# Patient Record
Sex: MALE | Race: OTHER | Employment: UNEMPLOYED | URBAN - METROPOLITAN AREA
[De-identification: names, ages, dates, MRNs, and addresses within clinical notes are randomized per-mention and may not be internally consistent; named-entity substitution may affect disease eponyms.]

---

## 2017-10-02 DIAGNOSIS — B00.1 HERPES LABIALIS: ICD-10-CM

## 2017-10-02 RX ORDER — VALACYCLOVIR HYDROCHLORIDE 1 G/1
1000 TABLET, FILM COATED ORAL 2 TIMES DAILY
Qty: 14 TABLET | Refills: 3 | Status: SHIPPED | OUTPATIENT
Start: 2017-10-02 | End: 2017-10-03 | Stop reason: SDUPTHER

## 2017-10-03 DIAGNOSIS — B00.1 HERPES LABIALIS: ICD-10-CM

## 2017-10-03 RX ORDER — VALACYCLOVIR HYDROCHLORIDE 1 G/1
1000 TABLET, FILM COATED ORAL 2 TIMES DAILY
Qty: 14 TABLET | Refills: 3 | Status: SHIPPED | OUTPATIENT
Start: 2017-10-03 | End: 2019-01-15 | Stop reason: SDUPTHER

## 2017-10-25 ENCOUNTER — TELEPHONE (OUTPATIENT)
Dept: FAMILY MEDICINE CLINIC | Age: 22
End: 2017-10-25

## 2018-08-20 ENCOUNTER — TELEPHONE (OUTPATIENT)
Dept: FAMILY MEDICINE CLINIC | Age: 23
End: 2018-08-20

## 2019-01-15 DIAGNOSIS — B00.1 HERPES LABIALIS: ICD-10-CM

## 2019-01-15 RX ORDER — VALACYCLOVIR HYDROCHLORIDE 1 G/1
1000 TABLET, FILM COATED ORAL 2 TIMES DAILY
Qty: 14 TABLET | Refills: 3 | Status: SHIPPED | OUTPATIENT
Start: 2019-01-15 | End: 2019-05-30 | Stop reason: SDUPTHER

## 2019-05-30 ENCOUNTER — OFFICE VISIT (OUTPATIENT)
Dept: FAMILY MEDICINE CLINIC | Age: 24
End: 2019-05-30

## 2019-05-30 VITALS
WEIGHT: 159 LBS | HEART RATE: 63 BPM | TEMPERATURE: 98.3 F | BODY MASS INDEX: 22.81 KG/M2 | SYSTOLIC BLOOD PRESSURE: 115 MMHG | OXYGEN SATURATION: 99 % | DIASTOLIC BLOOD PRESSURE: 76 MMHG

## 2019-05-30 DIAGNOSIS — H93.12 LEFT-SIDED TINNITUS: Primary | ICD-10-CM

## 2019-05-30 DIAGNOSIS — B00.1 HERPES LABIALIS: ICD-10-CM

## 2019-05-30 PROCEDURE — 99213 OFFICE O/P EST LOW 20 MIN: CPT | Performed by: FAMILY MEDICINE

## 2019-05-30 RX ORDER — VALACYCLOVIR HYDROCHLORIDE 1 G/1
1000 TABLET, FILM COATED ORAL 2 TIMES DAILY
Qty: 20 TABLET | Refills: 3 | Status: SHIPPED | OUTPATIENT
Start: 2019-05-30 | End: 2019-12-18

## 2019-05-30 ASSESSMENT — PATIENT HEALTH QUESTIONNAIRE - PHQ9
SUM OF ALL RESPONSES TO PHQ QUESTIONS 1-9: 0
2. FEELING DOWN, DEPRESSED OR HOPELESS: 0
SUM OF ALL RESPONSES TO PHQ9 QUESTIONS 1 & 2: 0
1. LITTLE INTEREST OR PLEASURE IN DOING THINGS: 0
SUM OF ALL RESPONSES TO PHQ QUESTIONS 1-9: 0

## 2019-05-30 NOTE — LETTER
[facility]  [address] [facility] [address]  Phone: [phone]  Fax: [phone]    Ariel Rubio MD        May 30, 2019     Patient: Maria A Teran   YOB: 1995   Date of Visit: 5/30/2019       To Whom it May Concern:    Maria A Teran was seen in my clinic on 5/30/2019. He may return to work on on 05/31/2019. If you have any questions or concerns, please don't hesitate to call.     Sincerely,         Ariel Rubio MD

## 2019-05-30 NOTE — PATIENT INSTRUCTIONS
Patient Education        Tinnitus: Care Instructions  Your Care Instructions    Many people have some ringing sounds in their ears once in a while. You may hear a roar, a hiss, a tinkle, or a buzz. The sound usually lasts only a few minutes. If it goes on all the time, you may have tinnitus. Tinnitus is usually caused by long-term exposure to loud noise. This damages the nerves in the inner ear. It can occur with all types of hearing loss. It may be a symptom of almost any ear problem. Tinnitus may be caused by a buildup of earwax. Or it may be caused by ear infections or certain medicines (especially antibiotics or large amounts of aspirin). You can also hear noises in your ears because of an injury to the ears, drinking too much alcohol or caffeine, or a medical condition. You may need tests to evaluate your hearing and to find causes of long-lasting tinnitus. Your doctor may suggest one or more treatments to help you cope with it. You can also do things at home to help reduce symptoms. Follow-up care is a key part of your treatment and safety. Be sure to make and go to all appointments, and call your doctor if you are having problems. It's also a good idea to know your test results and keep a list of the medicines you take. How can you care for yourself at home? · Limit or cut out alcohol and caffeine. They can make your symptoms worse. · Do not smoke or use other tobacco products. Nicotine reduces blood flow to the ear and makes tinnitus worse. If you need help quitting, talk to your doctor about stop-smoking programs and medicines. These can increase your chances of quitting for good. · Talk to your doctor about whether to stop taking aspirin and similar products such as ibuprofen or naproxen. · Get exercise often. It can improve blood flow to the ear. Ways to cope with noise  Some tinnitus may last a long time. To cope with noise, try to:  · Avoid noises that you think caused your tinnitus.  If you can't avoid loud noises, wear earplugs or earmuffs. · Ignore the sound by paying attention to other things. · Relax using biofeedback, meditation, or yoga. Feeling stressed and being tired can make tinnitus worse. · Play music or white noise to help you sleep. Background noise may cover up the noise that you hear in your ears. You can buy a machine that makes soothing sounds, such as ocean waves. When should you call for help? Call 911 anytime you think you may need emergency care. For example, call if:    · You have symptoms of a stroke. These may include:  ? Sudden numbness, tingling, weakness, or loss of movement in your face, arm, or leg, especially on only one side of your body. ? Sudden vision changes. ? Sudden trouble speaking. ? Sudden confusion or trouble understanding simple statements. ? Sudden problems with walking or balance. ? A sudden, severe headache that is different from past headaches.    Call your doctor now or seek immediate medical care if:    · You develop other symptoms. These may include hearing loss (or worse hearing loss), balance problems, dizziness, nausea, or vomiting.    Watch closely for changes in your health, and be sure to contact your doctor if:    · Your tinnitus moves from both ears to one ear.     · Your hearing loss gets worse within 1 day after an ear injury.     · Your tinnitus or hearing loss does not get better within 1 week after an ear injury.     · Your tinnitus bothers you enough that you want to take medicines to help you cope with it. Where can you learn more? Go to [email or web address]://[email or web address]. org and sign in to your [employer] account. Enter S165 in the [facility] box to learn more about \"Tinnitus: Care Instructions. \"     If you do not have an account, please click on the \"Sign Up Now\" link. Current as of: October 21, 2018  Content Version: 12.0  © 3547-6572 Healthwise, Incorporated.  Care instructions adapted under license by [facility]. If you have questions about a medical condition or this instruction, always ask your healthcare professional. [place] [address] any warranty or liability for your use of this information.

## 2019-05-30 NOTE — PROGRESS NOTES
General FM note    Henri Lesches is a 21 y.o. male who presents today for follow up on his  medical conditions as noted below. Henri Lesches is c/o of   Chief Complaint   Patient presents with    Medication Check       Patient Active Problem List:     Herpes labialis     Past Medical History:   Diagnosis Date    Back pain 2010    sport injury fx L5      Past Surgical History:   Procedure Laterality Date    CYST REMOVAL Left 11/13/2015    testicle     No family history on file. Current Outpatient Medications   Medication Sig Dispense Refill    valACYclovir (VALTREX) 1 g tablet Take 1 tablet by mouth 2 times daily 20 tablet 3    ibuprofen (ADVIL;MOTRIN) 800 MG tablet Take 800 mg by mouth every 6 hours as needed for Pain      oxyCODONE-acetaminophen (PERCOCET) 5-325 MG per tablet Take 1 tablet by mouth every 4 hours as needed for Pain       No current facility-administered medications for this visit. ALLERGIES:  No Known Allergies    Social History     Tobacco Use    Smoking status: Never Smoker   Substance Use Topics    Alcohol use: Yes     Comment: social      Body mass index is 22.81 kg/m². /76   Pulse 63   Temp 98.3 °F (36.8 °C) (Oral)   Wt 159 lb (72.1 kg)   SpO2 99%   BMI 22.81 kg/m²     Subjective:      HPI    22 yo male here for check up. Complains:   1. Static sound at the L ear for some months. Not there all the time. 2. Cold sore: first one in a while. The patient was on Valtrex and he wants to have a refill. Got a new job at the [employer].  at [address] [place] [address]. Review of Systems   Constitutional: Negative for fever and unexpected weight change. HENT: Negative for ear pain, congestion, sore throat and rhinorrhea. Positive for sound in the left ear. Respiratory: Negative for cough and shortness of breath. Cardiovascular: Negative for chest pain and leg swelling. Gastrointestinal: Negative for diarrhea, constipation and blood in stool.    Musculoskeletal: Negative for back pain and gait problem. Skin: Negative for color change and rash. Positive for cold sore. Objective:   Physical Exam  Constitutional: VS (see above). General appearance: normal development, habitus and attention, no deformities. No distress. Eyes: normal conjunctiva and lids. Ears: Bilaterally with normal limits. No cerumen present. CAV: RRR, no RMG. No edema lower extremities. Pulmo: CTA bilateral, no CWR. Skin: no rashes, lesions or ulcers. Lower lip, left L there is a swelling present. I did not observe any blisters. Musculoskeletal: normal gait. Nails: no clubbing or cyanosis. Psychiatric: alert and oriented to place, time and person. Normal mood and affect. Assessment:       Diagnosis Orders   1. Left-sided tinnitus     2. Herpes labialis  valACYclovir (VALTREX) 1 g tablet       Plan:   I discussed with patient that the left-sided sound in this year. It's probably a tinnitus. This needs to be watched. He needs to avoid loud noises. He needs to avoid wearing headphones. Valtrex provided. Call or return to clinic prn if these symptoms worsen or fail to improve as anticipated. I have reviewed the instructions with the patient, answering all questions to his satisfaction. Return in about 1 year (around 5/30/2020), or if symptoms worsen or fail to improve. No orders of the defined types were placed in this encounter. Orders Placed This Encounter   Medications    valACYclovir (VALTREX) 1 g tablet     Sig: Take 1 tablet by mouth 2 times daily     Dispense:  20 tablet     Refill:  3       Deacon received counseling on the following healthy behaviors: nutrition and exercise  Reviewed prior labs and health maintenance  Continue current medications, diet and exercise. Discussed use, benefit, and side effects of prescribed medications. Barriers to medication compliance addressed.    Patient given educational materials - see patient instructions  Was a self-tracking handout given in paper form or via [employer]? No: .    Requested Prescriptions     Signed Prescriptions Disp Refills    valACYclovir (VALTREX) 1 g tablet 20 tablet 3     Sig: Take 1 tablet by mouth 2 times daily       All patient questions answered. Patient voiced understanding. Quality Measures    Body mass index is 22.81 kg/m². Normal. Weight control planned discussed Healthy diet and regular exercise. BP: 115/76 Blood pressure is normal. Treatment plan consists of No treatment change needed.     No results found for: LDLCALC, LDLCHOLESTEROL, LDLDIRECT (goal LDL reduction with dx if diabetes is 50% LDL reduction)      PHQ Scores 5/30/2019   PHQ2 Score 0   PHQ9 Score 0     Interpretation of Total Score Depression Severity: 1-4 = Minimal depression, 5-9 = Mild depression, 10-14 = Moderate depression, 15-19 = Moderately severe depression, 20-27 = Severe depression     Electronically signed by Venkatesh Campos MD on 5/31/2019 at 5:57 AM       (Please note that portions of this note were completed with a voice recognition program. Efforts were made to edit the dictations but occasionally words are mis-transcribed.)

## 2019-06-25 ENCOUNTER — OFFICE VISIT (OUTPATIENT)
Dept: FAMILY MEDICINE CLINIC | Age: 24
End: 2019-06-25
Payer: COMMERCIAL

## 2019-06-25 VITALS
HEIGHT: 71 IN | TEMPERATURE: 97.9 F | SYSTOLIC BLOOD PRESSURE: 116 MMHG | OXYGEN SATURATION: 97 % | BODY MASS INDEX: 22.68 KG/M2 | HEART RATE: 78 BPM | DIASTOLIC BLOOD PRESSURE: 64 MMHG | WEIGHT: 162 LBS

## 2019-06-25 DIAGNOSIS — Z00.00 ENCOUNTER FOR WELL ADULT EXAM WITHOUT ABNORMAL FINDINGS: Primary | ICD-10-CM

## 2019-06-25 PROCEDURE — 99395 PREV VISIT EST AGE 18-39: CPT | Performed by: FAMILY MEDICINE

## 2019-06-25 NOTE — PROGRESS NOTES
Subjective:       Cecilio Moss is a 21 y.o. male and is here for a comprehensive physical exam.  The patient reports problems - :    Feels when sitting down knees higher than leg that there is not good circulations. Feels that when reading in bed numbness at the 4th and 5th fingers. Feels that when it is cold outside his hands and feet are very cold and he does have a lot of warmness to heat/warm them up again.  History:  Any STD's in the past? none  Past Medical History:   Diagnosis Date    Back pain 2010    sport injury fx L5     Patient Active Problem List    Diagnosis Date Noted    Herpes labialis 02/27/2015     Past Surgical History:   Procedure Laterality Date    CYST REMOVAL Left 11/13/2015    testicle     No family history on file.   Social History     Socioeconomic History    Marital status: Single     Spouse name: None    Number of children: None    Years of education: None    Highest education level: None   Occupational History    None   Social Needs    Financial resource strain: None    Food insecurity:     Worry: None     Inability: None    Transportation needs:     Medical: None     Non-medical: None   Tobacco Use    Smoking status: Never Smoker    Smokeless tobacco: Never Used   Substance and Sexual Activity    Alcohol use: Yes     Comment: social    Drug use: No    Sexual activity: None   Lifestyle    Physical activity:     Days per week: None     Minutes per session: None    Stress: None   Relationships    Social connections:     Talks on phone: None     Gets together: None     Attends Alevism service: None     Active member of club or organization: None     Attends meetings of clubs or organizations: None     Relationship status: None    Intimate partner violence:     Fear of current or ex partner: None     Emotionally abused: None     Physically abused: None     Forced sexual activity: None   Other Topics Concern    None   Social History Narrative    None     Current 78   Temp 97.9 °F (36.6 °C) (Oral)   Ht 5' 11\" (1.803 m)   Wt 162 lb (73.5 kg)   SpO2 97%   BMI 22.59 kg/m²   Constitutional: Alert and oriented. Well-nourished. No distress. Head: Normocephalic and atraumatic. Right Ear: External ear normal. TM: no bulging, erythema or fluid seen. Left Ear: External ear normal. TM: no bulging, erythema or fluid seen. Nose: Nose normal.   Mouth/Throat: Oropharynx is clear and moist. teeth in good repair. Eyes: Pupils are equal, round, and reactive to light. Right eye exhibits no discharge. Left eye exhibits no discharge. No scleral icterus. Neck: Normal range of motion. Neck supple. No JVD present. No tracheal deviation present. No thyromegaly present. Cardiovascular: Normal rate, regular rhythm, normal heart sounds. Pulmonary/Chest: Effort normal and breath sounds normal. No respiratory distress. He has no wheezes. He has no rales. Abdominal: Soft. Bowel sounds are normal.  He exhibits no distension and no mass. There is no tenderness. There is no rebound and no guarding. Musculoskeletal: Normal range of motion. He exhibits no edema or tenderness. Lymphadenopathy:    He has no cervical adenopathy. Neurological:  He is alert and oriented to person, place, and time. Cranial nerves grossly intact. No sensation problem noted. Muscle strength 5/5 throughout. Skin: Skin is warm and dry. No rash noted. No erythema. Psychiatric:  He has a normal mood and affect. Behavior is normal.    Aure Samano was seen today for annual exam and anemia. Diagnoses and all orders for this visit:    Encounter for well adult exam without abnormal findings  -     CBC Auto Differential; Future  -     Comprehensive Metabolic Panel; Future  -     Urinalysis; Future  -     TSH with Reflex; Future    Call or return to clinic prn if these symptoms worsen or fail to improve as anticipated.   I have reviewed the instructions with the patient, answering all questions to his satisfaction. Patient Counseling:  --Nutrition: Stressed importance of moderation in sodium/caffeine intake, saturated fat and cholesterol, caloric balance, sufficient intake of fresh fruits, vegetables, fiber, calcium, iron, and 1 mg of folate supplement per day (for females capable of pregnancy). --Exercise: Stressed the importance of regular exercise. --Substance Abuse: Discussed cessation/primary prevention of tobacco, alcohol, or other drug use; driving or other dangerous activities under the influence; availability of treatment for abuse. --Sexuality: Discussed sexually transmitted diseases, partner selection, use of condoms, avoidance of unintended pregnancy  and contraceptive alternatives. --Injury prevention: Discussed safety belts, safety helmets, smoke detector, smoking near bedding or upholstery. --Dental health: Discussed importance of regular tooth brushing, flossing, and dental visits. --Immunizations reviewed.   --After hours service discussed with patient    Follow up in one year     (Please note that portions of this note were completed with a voice recognition program. Efforts were made to edit the dictations but occasionally words are mis-transcribed.)

## 2019-09-09 ENCOUNTER — OFFICE VISIT (OUTPATIENT)
Dept: FAMILY MEDICINE CLINIC | Age: 24
End: 2019-09-09
Payer: COMMERCIAL

## 2019-09-09 VITALS
OXYGEN SATURATION: 96 % | TEMPERATURE: 98.1 F | BODY MASS INDEX: 22.32 KG/M2 | HEART RATE: 68 BPM | DIASTOLIC BLOOD PRESSURE: 74 MMHG | WEIGHT: 160 LBS | SYSTOLIC BLOOD PRESSURE: 117 MMHG

## 2019-09-09 DIAGNOSIS — J06.9 VIRAL URI: Primary | ICD-10-CM

## 2019-09-09 PROCEDURE — 99213 OFFICE O/P EST LOW 20 MIN: CPT | Performed by: FAMILY MEDICINE

## 2019-09-09 RX ORDER — FLUTICASONE PROPIONATE 50 MCG
1 SPRAY, SUSPENSION (ML) NASAL DAILY
Qty: 2 BOTTLE | Refills: 1 | Status: SHIPPED | OUTPATIENT
Start: 2019-09-09 | End: 2019-12-18

## 2019-09-09 RX ORDER — AZITHROMYCIN 250 MG/1
TABLET, FILM COATED ORAL
Qty: 6 TABLET | Refills: 0 | Status: SHIPPED | OUTPATIENT
Start: 2019-09-09 | End: 2019-09-19

## 2019-12-09 ENCOUNTER — OFFICE VISIT (OUTPATIENT)
Dept: FAMILY MEDICINE CLINIC | Age: 24
End: 2019-12-09
Payer: COMMERCIAL

## 2019-12-09 VITALS
SYSTOLIC BLOOD PRESSURE: 114 MMHG | DIASTOLIC BLOOD PRESSURE: 67 MMHG | WEIGHT: 160 LBS | TEMPERATURE: 98.4 F | OXYGEN SATURATION: 98 % | HEART RATE: 75 BPM | BODY MASS INDEX: 22.32 KG/M2

## 2019-12-09 DIAGNOSIS — K52.9 ACUTE GASTROENTERITIS: Primary | ICD-10-CM

## 2019-12-09 PROCEDURE — 99213 OFFICE O/P EST LOW 20 MIN: CPT | Performed by: FAMILY MEDICINE

## 2019-12-09 RX ORDER — ONDANSETRON 4 MG/1
4 TABLET, ORALLY DISINTEGRATING ORAL 3 TIMES DAILY PRN
Qty: 21 TABLET | Refills: 0 | Status: SHIPPED | OUTPATIENT
Start: 2019-12-09 | End: 2019-12-18

## 2019-12-18 ENCOUNTER — OFFICE VISIT (OUTPATIENT)
Dept: FAMILY MEDICINE CLINIC | Age: 24
End: 2019-12-18
Payer: COMMERCIAL

## 2019-12-18 VITALS
WEIGHT: 163.8 LBS | DIASTOLIC BLOOD PRESSURE: 73 MMHG | HEART RATE: 54 BPM | BODY MASS INDEX: 22.85 KG/M2 | SYSTOLIC BLOOD PRESSURE: 117 MMHG | TEMPERATURE: 97.1 F | OXYGEN SATURATION: 97 %

## 2019-12-18 DIAGNOSIS — J02.9 ACUTE VIRAL PHARYNGITIS: Primary | ICD-10-CM

## 2019-12-18 DIAGNOSIS — H61.21 CERUMEN DEBRIS ON TYMPANIC MEMBRANE OF RIGHT EAR: ICD-10-CM

## 2019-12-18 DIAGNOSIS — J02.9 SORE THROAT: ICD-10-CM

## 2019-12-18 LAB — S PYO AG THROAT QL: NORMAL

## 2019-12-18 PROCEDURE — 87880 STREP A ASSAY W/OPTIC: CPT | Performed by: FAMILY MEDICINE

## 2019-12-18 PROCEDURE — 99213 OFFICE O/P EST LOW 20 MIN: CPT | Performed by: FAMILY MEDICINE

## 2020-01-07 ENCOUNTER — OFFICE VISIT (OUTPATIENT)
Dept: FAMILY MEDICINE CLINIC | Age: 25
End: 2020-01-07
Payer: COMMERCIAL

## 2020-01-07 VITALS
DIASTOLIC BLOOD PRESSURE: 66 MMHG | OXYGEN SATURATION: 99 % | WEIGHT: 158 LBS | HEART RATE: 87 BPM | SYSTOLIC BLOOD PRESSURE: 108 MMHG | BODY MASS INDEX: 22.12 KG/M2 | HEIGHT: 71 IN

## 2020-01-07 PROCEDURE — 99213 OFFICE O/P EST LOW 20 MIN: CPT | Performed by: FAMILY MEDICINE

## 2020-01-07 RX ORDER — ALBUTEROL SULFATE 90 UG/1
2 AEROSOL, METERED RESPIRATORY (INHALATION) EVERY 4 HOURS PRN
Qty: 1 INHALER | Refills: 3 | Status: SHIPPED | OUTPATIENT
Start: 2020-01-07

## 2020-01-07 RX ORDER — PREDNISONE 10 MG/1
TABLET ORAL
Qty: 15 TABLET | Refills: 0 | Status: SHIPPED | OUTPATIENT
Start: 2020-01-07

## 2020-01-07 RX ORDER — AZITHROMYCIN 500 MG/1
500 TABLET, FILM COATED ORAL DAILY
Qty: 5 TABLET | Refills: 0 | Status: SHIPPED | OUTPATIENT
Start: 2020-01-07 | End: 2020-01-12

## 2020-01-07 ASSESSMENT — ENCOUNTER SYMPTOMS
NAUSEA: 0
COLOR CHANGE: 0
CONSTIPATION: 0
ANAL BLEEDING: 0
VOMITING: 0
PHOTOPHOBIA: 0
BACK PAIN: 0
RHINORRHEA: 1
FACIAL SWELLING: 0
APNEA: 0
EYE PAIN: 0
SHORTNESS OF BREATH: 1
DIARRHEA: 0
ABDOMINAL DISTENTION: 0
CHEST TIGHTNESS: 0
COUGH: 1
WHEEZING: 1
EYE DISCHARGE: 0
TROUBLE SWALLOWING: 0
ABDOMINAL PAIN: 0
SINUS PRESSURE: 0
SORE THROAT: 1

## 2020-01-07 ASSESSMENT — PATIENT HEALTH QUESTIONNAIRE - PHQ9
2. FEELING DOWN, DEPRESSED OR HOPELESS: 0
1. LITTLE INTEREST OR PLEASURE IN DOING THINGS: 0
SUM OF ALL RESPONSES TO PHQ QUESTIONS 1-9: 0
SUM OF ALL RESPONSES TO PHQ QUESTIONS 1-9: 0
SUM OF ALL RESPONSES TO PHQ9 QUESTIONS 1 & 2: 0

## 2020-01-07 NOTE — PROGRESS NOTES
Sharath Lal MD, PhD [facility]  21.      Maegan Burleson is a 21 y.o. male who presents today for his medical conditions/complaints as noted below. Maegan Burleson is c/o of   Chief Complaint   Patient presents with    Shortness of Breath     wheezing    Congestion     chest and nasal    Cough     productive         HPI:     Shortness of Breath   This is a recurrent problem. The current episode started in the past 7 days. The problem has been rapidly worsening. Associated symptoms include rhinorrhea, a sore throat and wheezing. Pertinent negatives include no abdominal pain, chest pain, fever, neck pain, rash or vomiting. Cough   This is a recurrent problem. The current episode started in the past 7 days. The problem has been gradually worsening. The cough is productive of sputum. Associated symptoms include postnasal drip, rhinorrhea, a sore throat, shortness of breath and wheezing. Pertinent negatives include no chest pain, fever or rash. No results found for: LABA1C          ( goal A1C is < 7)   No results found for: LABMICR  No results found for: LDLCHOLESTEROL, LDLCALC    (goal LDL is <100)   BUN (mg/dL)   Date Value   11/27/2015 25 (H)     BP Readings from Last 3 Encounters:   01/07/20 108/66   12/18/19 117/73   12/09/19 114/67          (goal 120/80)    Past Medical History:   Diagnosis Date    Back pain 2010    sport injury fx L5      Past Surgical History:   Procedure Laterality Date    CYST REMOVAL Left 11/13/2015    testicle       No family history on file.     Social History     Tobacco Use    Smoking status: Never Smoker    Smokeless tobacco: Never Used   Substance Use Topics    Alcohol use: Yes     Comment: social      Current Outpatient Medications   Medication Sig Dispense Refill    azithromycin (ZITHROMAX) 500 MG tablet Take 1 tablet by mouth daily for 5 days 5 tablet 0    predniSONE (DELTASONE) 10 General: He is not in acute distress. Appearance: He is well-developed. HENT:      Head: Normocephalic. Mouth/Throat:     Neck:      Musculoskeletal: Normal range of motion and neck supple. Thyroid: No thyromegaly. Cardiovascular:      Rate and Rhythm: Normal rate and regular rhythm. Heart sounds: Normal heart sounds. No murmur. Pulmonary:      Breath sounds: Wheezing and rhonchi present. No rales. Chest:      Chest wall: No tenderness. Abdominal:      General: Bowel sounds are normal. There is no distension. Palpations: Abdomen is soft. There is no mass. Tenderness: There is no tenderness. There is no guarding or rebound. Musculoskeletal: Normal range of motion. Lymphadenopathy:      Cervical: No cervical adenopathy. Skin:     General: Skin is warm. Findings: No rash. Neurological:      Mental Status: He is alert and oriented to person, place, and time. Psychiatric:         Attention and Perception: Attention and perception normal.         Mood and Affect: Mood is depressed. Speech: Speech normal.         Behavior: Behavior normal. Behavior is cooperative. /66   Pulse 87   Ht 5' 10.98\" (1.803 m)   Wt 158 lb (71.7 kg)   SpO2 99%   BMI 22.05 kg/m²     Assessment:       Diagnosis Orders   1. Acute asthmatic bronchitis     2. Acute bacterial pharyngitis                   Plan:    Zithromax daily x 5 days  Prednisone taperred  Ventolin HFA prn  Counseling  The current medical regimen is effective;  continue present plan and medications. Return in about 1 month (around 2/7/2020) for follow up. No orders of the defined types were placed in this encounter. Patient given educational materials - see patient instructions. Discussed use, benefit,and side effects of prescribed medications. All patient questions answered. Pt voiced understanding. Reviewed health maintenance. Instructed to continue current medications, diet and exercise. Patient agreed withtreatment plan. Follow up as directed.      Electronically signed by Bhanu Navarrete MD on 1/7/2020 at 6:43 PM

## 2020-03-10 ENCOUNTER — OFFICE VISIT (OUTPATIENT)
Dept: FAMILY MEDICINE CLINIC | Age: 25
End: 2020-03-10
Payer: COMMERCIAL

## 2020-03-10 VITALS
DIASTOLIC BLOOD PRESSURE: 70 MMHG | WEIGHT: 169.4 LBS | BODY MASS INDEX: 24.25 KG/M2 | HEART RATE: 81 BPM | RESPIRATION RATE: 16 BRPM | OXYGEN SATURATION: 98 % | HEIGHT: 70 IN | SYSTOLIC BLOOD PRESSURE: 121 MMHG

## 2020-03-10 PROCEDURE — 99213 OFFICE O/P EST LOW 20 MIN: CPT | Performed by: FAMILY MEDICINE

## 2020-03-10 RX ORDER — CIPROFLOXACIN 500 MG/1
500 TABLET, FILM COATED ORAL 2 TIMES DAILY
Qty: 10 TABLET | Refills: 0 | Status: SHIPPED | OUTPATIENT
Start: 2020-03-10 | End: 2020-03-15

## 2020-03-10 RX ORDER — PROMETHAZINE HYDROCHLORIDE 25 MG/1
25 TABLET ORAL EVERY 6 HOURS PRN
Qty: 30 TABLET | Refills: 1 | Status: SHIPPED | OUTPATIENT
Start: 2020-03-10 | End: 2020-03-17

## 2020-03-10 ASSESSMENT — ENCOUNTER SYMPTOMS
ABDOMINAL DISTENTION: 0
EYE DISCHARGE: 0
ANAL BLEEDING: 0
SINUS PRESSURE: 0
NAUSEA: 0
BACK PAIN: 0
COLOR CHANGE: 0
PHOTOPHOBIA: 0
VOMITING: 1
EYE PAIN: 0
ABDOMINAL PAIN: 1
TROUBLE SWALLOWING: 0
CHEST TIGHTNESS: 0
WHEEZING: 1
APNEA: 0
CONSTIPATION: 0
FACIAL SWELLING: 0
SORE THROAT: 0
DIARRHEA: 0
SHORTNESS OF BREATH: 0

## 2020-03-10 NOTE — PROGRESS NOTES
Beulah Arias MD, PhD [address] [address]      Bi Fields is a 31 y.o. male who presents today for his medical conditions/complaints as noted below. Bi Fields is c/o of   Chief Complaint   Patient presents with    Abdominal Pain         HPI:     Abdominal Pain   This is a recurrent problem. The current episode started in the past 7 days. The problem has been gradually worsening. The pain is located in the RLQ. The quality of the pain is aching, colicky, dull and cramping. The abdominal pain radiates to the RLQ. Associated symptoms include anorexia, arthralgias and vomiting. Pertinent negatives include no constipation, diarrhea, fever, frequency, hematuria or nausea. No results found for: LABA1C          ( goal A1C is < 7)   No results found for: LABMICR  No results found for: LDLCHOLESTEROL, LDLCALC    (goal LDL is <100)   BUN (mg/dL)   Date Value   11/27/2015 25 (H)     BP Readings from Last 3 Encounters:   03/10/20 121/70   01/07/20 108/66   12/18/19 117/73          (goal 120/80)    Past Medical History:   Diagnosis Date    Back pain 2010    sport injury fx L5      Past Surgical History:   Procedure Laterality Date    CYST REMOVAL Left 11/13/2015    testicle       No family history on file.     Social History     Tobacco Use    Smoking status: Never Smoker    Smokeless tobacco: Never Used   Substance Use Topics    Alcohol use: Yes     Comment: social      Current Outpatient Medications   Medication Sig Dispense Refill    promethazine (PHENERGAN) 25 MG tablet Take 1 tablet by mouth every 6 hours as needed for Nausea 30 tablet 1    ciprofloxacin (CIPRO) 500 MG tablet Take 1 tablet by mouth 2 times daily for 5 days 10 tablet 0    predniSONE (DELTASONE) 10 MG tablet Take 20 mg for 5 days, then 10 mg for 5days 15 tablet 0    albuterol sulfate HFA (VENTOLIN HFA) 108 (90 Base) MCG/ACT inhaler Inhale 2 puffs into the lungs every 4 hours as needed for Wheezing 1 Inhaler 3     No current facility-administered medications for this visit. No Known Allergies    Health Maintenance   Topic Date Due    Hepatitis B vaccine (3 of 3 - 3-dose primary series) 1994    Varicella vaccine (1 of 2 - 2-dose childhood series) 03/02/1995    Pneumococcal 0-64 years Vaccine (1 of 1 - PPSV23) 03/02/2000    HPV vaccine (1 - Male 2-dose series) 03/02/2005    HIV screen  03/02/2009    Flu vaccine (1) 09/01/2019    DTaP/Tdap/Td vaccine (2 - Td) 05/16/2024    Shingles Vaccine (1 of 2) 03/02/2044    Hepatitis A vaccine  Completed    Hib vaccine  Aged Out    Meningococcal (ACWY) vaccine  Aged Out       Subjective:      Review of Systems   Constitutional: Negative for fatigue, fever and unexpected weight change. HENT: Negative for congestion, ear discharge, facial swelling, sinus pressure, sore throat and trouble swallowing. Eyes: Negative for photophobia, pain and discharge. Respiratory: Positive for wheezing. Negative for apnea, chest tightness and shortness of breath. Cardiovascular: Negative for chest pain and palpitations. Gastrointestinal: Positive for abdominal pain, anorexia and vomiting. Negative for abdominal distention, anal bleeding, constipation, diarrhea and nausea. Endocrine: Negative for cold intolerance, heat intolerance, polydipsia, polyphagia and polyuria. Genitourinary: Negative for difficulty urinating, flank pain, frequency and hematuria. Musculoskeletal: Positive for arthralgias. Negative for back pain, gait problem and neck pain. Skin: Negative for color change and rash. Neurological: Negative for dizziness, syncope, facial asymmetry, speech difficulty and light-headedness. Hematological: Negative for adenopathy. Psychiatric/Behavioral: Negative for agitation, behavioral problems, confusion, hallucinations and suicidal ideas. The patient is not nervous/anxious and is not hyperactive. Objective:     Physical Exam  Vitals signs and nursing note reviewed. Constitutional:       General: He is not in acute distress. Appearance: He is well-developed. HENT:      Head: Normocephalic. Neck:      Musculoskeletal: Normal range of motion and neck supple. Thyroid: No thyromegaly. Cardiovascular:      Rate and Rhythm: Normal rate and regular rhythm. Heart sounds: Normal heart sounds. No murmur. Pulmonary:      Breath sounds: Wheezing (rare) present. No rales. Chest:      Chest wall: No tenderness. Abdominal:      General: Bowel sounds are normal. There is no distension. Palpations: Abdomen is soft. There is no mass. Tenderness: There is abdominal tenderness in the right lower quadrant. There is no guarding or rebound. Musculoskeletal: Normal range of motion. Lymphadenopathy:      Cervical: No cervical adenopathy. Skin:     General: Skin is warm. Findings: No rash. Neurological:      Mental Status: He is alert and oriented to person, place, and time. Psychiatric:         Attention and Perception: Attention and perception normal.         Mood and Affect: Mood is anxious. Speech: Speech normal.         Behavior: Behavior normal. Behavior is cooperative. Thought Content: Thought content normal.         Cognition and Memory: Cognition and memory normal.         Judgment: Judgment normal.       /70   Pulse 81   Resp 16   Ht 5' 10\" (1.778 m)   Wt 169 lb 6.4 oz (76.8 kg)   SpO2 98%   BMI 24.31 kg/m²     Assessment:       Diagnosis Orders   1. Right lower quadrant abdominal pain     2. Acute appendicitis, unspecified acute appendicitis type SUSPECTED US APPENDIX   3. Acute gastroenteritis     4. Left-sided tinnitus resolved    5.  Mild persistent asthma without complication                   Plan:    Dietary/life style modifications  Pt refused ER  U/S appendix STAT  Full labs  The current medical regimen is effective;  continue present plan and medications. Return in about 10 days (around 3/20/2020) for follow up. Orders Placed This Encounter   Procedures    US APPENDIX     Standing Status:   Future     Standing Expiration Date:   3/10/2021     Order Specific Question:   Reason for exam:     Answer:   APPENDICITIS        Patient given educational materials - see patient instructions. Discussed use, benefit,and side effects of prescribed medications. All patient questions answered. Pt voiced understanding. Reviewed health maintenance. Instructed to continue current medications, diet and exercise. Patient agreed withtreatment plan. Follow up as directed.      Electronically signed by Ashley Colon MD on 3/10/2020 at 5:02 PM

## 2020-03-10 NOTE — LETTER
Merged with [facility]  [address] [place] [address] [address] [facility] [address]  Phone: [phone]  Fax: [phone]    Maykel Smith MD        March 10, 2020     Patient: Sunita Tucker   YOB: 1995   Date of Visit: 3/10/2020       To Whom It May Concern: It is my medical opinion that Sunita Tucker should remain out of work until 3/10/20. If you have any questions or concerns, please don't hesitate to call.     Sincerely,        Maykel Smith MD

## 2020-03-25 ENCOUNTER — TELEPHONE (OUTPATIENT)
Dept: FAMILY MEDICINE CLINIC | Age: 25
End: 2020-03-25

## 2020-03-25 NOTE — TELEPHONE ENCOUNTER
Pt asks for letter excusing him from work. A individual tested positive in his work place and he feels he should stay home. Travel screen complete. No symptoms just exposure.

## 2020-06-05 RX ORDER — VALACYCLOVIR HYDROCHLORIDE 1 G/1
TABLET, FILM COATED ORAL
Qty: 20 TABLET | Refills: 3 | Status: SHIPPED | OUTPATIENT
Start: 2020-06-05

## 2020-06-26 ENCOUNTER — OFFICE VISIT (OUTPATIENT)
Dept: FAMILY MEDICINE CLINIC | Age: 25
End: 2020-06-26
Payer: COMMERCIAL

## 2020-06-26 VITALS
TEMPERATURE: 97.7 F | SYSTOLIC BLOOD PRESSURE: 112 MMHG | WEIGHT: 173.6 LBS | HEART RATE: 65 BPM | DIASTOLIC BLOOD PRESSURE: 62 MMHG | HEIGHT: 71 IN | OXYGEN SATURATION: 97 % | BODY MASS INDEX: 24.3 KG/M2

## 2020-06-26 PROCEDURE — 99214 OFFICE O/P EST MOD 30 MIN: CPT | Performed by: FAMILY MEDICINE

## 2020-06-26 RX ORDER — MELOXICAM 15 MG/1
15 TABLET ORAL DAILY
Qty: 21 TABLET | Refills: 0 | Status: SHIPPED | OUTPATIENT
Start: 2020-06-26

## 2020-06-26 RX ORDER — TIZANIDINE HYDROCHLORIDE 4 MG/1
4 CAPSULE, GELATIN COATED ORAL NIGHTLY
Qty: 30 CAPSULE | Refills: 0 | Status: SHIPPED | OUTPATIENT
Start: 2020-06-26

## 2020-06-26 ASSESSMENT — ENCOUNTER SYMPTOMS
VOMITING: 0
EYE DISCHARGE: 0
FACIAL SWELLING: 0
EYE PAIN: 0
BACK PAIN: 1
SORE THROAT: 0
PHOTOPHOBIA: 0
SHORTNESS OF BREATH: 0
ABDOMINAL PAIN: 0
ABDOMINAL DISTENTION: 0
NAUSEA: 0
SINUS PRESSURE: 0
WHEEZING: 0
CONSTIPATION: 0
ANAL BLEEDING: 0
CHEST TIGHTNESS: 0
TROUBLE SWALLOWING: 0
COLOR CHANGE: 0
APNEA: 0
DIARRHEA: 0

## 2020-06-26 NOTE — PROGRESS NOTES
dizziness, syncope, facial asymmetry, speech difficulty and light-headedness. Hematological: Negative for adenopathy. Psychiatric/Behavioral: Positive for sleep disturbance. Negative for agitation, behavioral problems, confusion, hallucinations and suicidal ideas. The patient is nervous/anxious. The patient is not hyperactive. Objective:     Physical Exam  Vitals signs and nursing note reviewed. Constitutional:       General: He is not in acute distress. Appearance: He is well-developed. HENT:      Head: Normocephalic. Neck:      Musculoskeletal: Normal range of motion and neck supple. Thyroid: No thyromegaly. Cardiovascular:      Rate and Rhythm: Normal rate and regular rhythm. Heart sounds: Normal heart sounds. No murmur. Pulmonary:      Breath sounds: Normal breath sounds. No wheezing or rales. Chest:      Chest wall: No tenderness. Abdominal:      General: Bowel sounds are normal. There is no distension. Palpations: Abdomen is soft. There is no mass. Tenderness: There is no abdominal tenderness. There is no guarding or rebound. Musculoskeletal:         General: Tenderness present. Right shoulder: He exhibits decreased range of motion and tenderness. Thoracic back: He exhibits decreased range of motion and tenderness. Back:    Lymphadenopathy:      Cervical: No cervical adenopathy. Skin:     General: Skin is warm. Findings: No rash. Neurological:      Mental Status: He is alert and oriented to person, place, and time. /62   Pulse 65   Temp 97.7 °F (36.5 °C)   Ht 5' 11\" (1.803 m)   Wt 173 lb 9.6 oz (78.7 kg)   SpO2 97%   BMI 24.21 kg/m²     Assessment:       Diagnosis Orders   1. Rotator cuff arthropathy of right shoulder  External Referral To Massage Therapy   2. Thoracic back pain, unspecified back pain laterality, unspecified chronicity  External Referral To Massage Therapy   3.  Mild persistent asthma without complication                   Plan:    PT/massage  Mobic/Zanaflex both prn  Life style and exercise modifications  Otherwise the current medical regimen is effective;  continue present plan and medications. Return in about 4 weeks (around 7/24/2020) for follow up. Orders Placed This Encounter   Procedures    External Referral To Massage Therapy     Referral Priority:   Routine     Referral Type:   Consult for Advice and Opinion     Referral Reason:   Specialty Services Required     Referral Location:   [facility]     Requested Specialty:   Massage Therapy     Number of Visits Requested:   1        Patient given educational materials - see patient instructions. Discussed use, benefit,and side effects of prescribed medications. All patient questions answered. Pt voiced understanding. Reviewed health maintenance. Instructed to continue current medications, diet and exercise. Patient agreed withtreatment plan. Follow up as directed.      Electronically signed by Hamilton Driscoll MD on 6/26/2020 at 6:09 PM